# Patient Record
Sex: FEMALE | Race: WHITE | NOT HISPANIC OR LATINO | Employment: OTHER | URBAN - METROPOLITAN AREA
[De-identification: names, ages, dates, MRNs, and addresses within clinical notes are randomized per-mention and may not be internally consistent; named-entity substitution may affect disease eponyms.]

---

## 2017-05-01 ENCOUNTER — HOSPITAL ENCOUNTER (OUTPATIENT)
Dept: GENERAL RADIOLOGY | Facility: HOSPITAL | Age: 27
Discharge: HOME | End: 2017-05-01
Attending: NURSE PRACTITIONER | Admitting: NURSE PRACTITIONER

## 2018-07-31 ENCOUNTER — HOSPITAL ENCOUNTER (OUTPATIENT)
Dept: FAMILY MEDICINE CLINIC | Facility: CLINIC | Age: 28
Setting detail: SPECIMEN
Discharge: HOME | End: 2018-07-31
Attending: INTERNAL MEDICINE | Admitting: INTERNAL MEDICINE

## 2018-07-31 LAB
ALBUMIN SERPL-MCNC: 4 G/DL (ref 3.5–4.8)
ALBUMIN/GLOB SERPL: 1.2 {RATIO} (ref 1–1.7)
ALP SERPL-CCNC: 94 IU/L (ref 32–91)
ALT SERPL-CCNC: 17 IU/L (ref 14–54)
ANION GAP SERPL CALC-SCNC: 13.6 MMOL/L (ref 10–20)
AST SERPL-CCNC: 18 IU/L (ref 15–41)
BASOPHILS # BLD AUTO: 0.1 10*3/UL (ref 0–0.2)
BASOPHILS NFR BLD AUTO: 1 % (ref 0–2)
BILIRUB SERPL-MCNC: 0.5 MG/DL (ref 0.3–1.2)
BUN SERPL-MCNC: 6 MG/DL (ref 8–20)
BUN/CREAT SERPL: 8.6 (ref 5.4–26.2)
CALCIUM SERPL-MCNC: 9.5 MG/DL (ref 8.9–10.3)
CHLORIDE SERPL-SCNC: 105 MMOL/L (ref 101–111)
CHOLEST SERPL-MCNC: 211 MG/DL
CHOLEST/HDLC SERPL: 6.8 {RATIO}
CONV CO2: 22 MMOL/L (ref 22–32)
CONV LDL CHOLESTEROL DIRECT: 123 MG/DL (ref 0–100)
CONV TOTAL PROTEIN: 7.3 G/DL (ref 6.1–7.9)
CREAT UR-MCNC: 0.7 MG/DL (ref 0.4–1)
DIFFERENTIAL METHOD BLD: (no result)
EOSINOPHIL # BLD AUTO: 0.1 10*3/UL (ref 0–0.3)
EOSINOPHIL # BLD AUTO: 1 % (ref 0–3)
ERYTHROCYTE [DISTWIDTH] IN BLOOD BY AUTOMATED COUNT: 13.1 % (ref 11.5–14.5)
GLOBULIN UR ELPH-MCNC: 3.3 G/DL (ref 2.5–3.8)
GLUCOSE SERPL-MCNC: 82 MG/DL (ref 65–99)
HCT VFR BLD AUTO: 42 % (ref 35–49)
HDLC SERPL-MCNC: 31 MG/DL
HGB BLD-MCNC: 14.7 G/DL (ref 12–15)
LDLC/HDLC SERPL: 4 {RATIO}
LIPID INTERPRETATION: ABNORMAL
LYMPHOCYTES # BLD AUTO: 2.6 10*3/UL (ref 0.8–4.8)
LYMPHOCYTES NFR BLD AUTO: 27 % (ref 18–42)
MCH RBC QN AUTO: 30 PG (ref 26–32)
MCHC RBC AUTO-ENTMCNC: 35 G/DL (ref 32–36)
MCV RBC AUTO: 85.5 FL (ref 80–94)
MONOCYTES # BLD AUTO: 0.7 10*3/UL (ref 0.1–1.3)
MONOCYTES NFR BLD AUTO: 7 % (ref 2–11)
NEUTROPHILS # BLD AUTO: 6.1 10*3/UL (ref 2.3–8.6)
NEUTROPHILS NFR BLD AUTO: 64 % (ref 50–75)
NRBC BLD AUTO-RTO: 0 /100{WBCS}
NRBC/RBC NFR BLD MANUAL: 0 10*3/UL
PLATELET # BLD AUTO: 343 10*3/UL (ref 150–450)
PMV BLD AUTO: 9 FL (ref 7.4–10.4)
POTASSIUM SERPL-SCNC: 3.6 MMOL/L (ref 3.6–5.1)
RBC # BLD AUTO: 4.91 10*6/UL (ref 4–5.4)
SODIUM SERPL-SCNC: 137 MMOL/L (ref 136–144)
TRIGL SERPL-MCNC: 312 MG/DL
VLDLC SERPL CALC-MCNC: 56.9 MG/DL
WBC # BLD AUTO: 9.5 10*3/UL (ref 4.5–11.5)

## 2020-03-26 ENCOUNTER — TELEPHONE (OUTPATIENT)
Dept: FAMILY MEDICINE CLINIC | Facility: CLINIC | Age: 30
End: 2020-03-26

## 2020-03-26 NOTE — TELEPHONE ENCOUNTER
PATIENT'S MOTHER, KOMAL BARNES, CALLED REQUESTING TO LEAVE A MESSAGE WITH DR. VANEGAS REGARDING THE PATIENT, PABLO BARNES.    THE PATIENT'S MOTHER STATED THAT THE PATIENT IS SEVERELY DISABLED (SEVERE AUTISM), SO SHE WANTED TO GIVE AN UPDATE ABOUT HER IN CASE DR. VANEGAS WAS WONDERING.    THE PATIENT'S MOTHER STATED THAT THE PATIENT IS SAFE AT THE HOUSE AND SHE GOES ABSOLUTELY NO WHERE AND THEY HAVE NO ONE COME INTO THE HOUSE EITHER. THE PATIENT'S MOTHER STATED THAT THE PATIENT IS EATING WELL AND SHE GETS DAILY EXERCISE. THE PATIENT'S MOTHER STATED THAT SHE TAKES THE PATIENT OUT IN THE FRESH AIR TOO IF THE WHETHER IS NICE.    THE PATIENT'S MOTHER STATED THAT SHE DOES HAVE PLANS TO BRING THE PATIENT IN OFFICE FOR HER YEARLY CHECK UP WHEN EVERYTHING CALMS DOWN WITH COVID-19. THE PATIENT'S MOTHER JUST WANTED DR. VANEGSA TO BE AWARE OF THIS IN CASE SHE WAS WONDERING BECAUSE IT HAS BEEN A WHILE SINCE SHE HAS BEEN SEEN IN OFFICE.     IF THERE ARE ANY QUESTIONS OR CONCERNS PLEASE CALL THE PATIENT'S MOTHER [phone].

## 2020-03-26 NOTE — TELEPHONE ENCOUNTER
Please tell mom that I agree-   However, if she has an acute issue and still during the COVID current situation, she can activate her mychart and we could do a telemedicine appt through her smart phone video.

## 2020-08-07 ENCOUNTER — OFFICE VISIT (OUTPATIENT)
Dept: FAMILY MEDICINE CLINIC | Facility: CLINIC | Age: 30
End: 2020-08-07

## 2020-08-07 ENCOUNTER — LAB (OUTPATIENT)
Dept: FAMILY MEDICINE CLINIC | Facility: CLINIC | Age: 30
End: 2020-08-07

## 2020-08-07 VITALS
DIASTOLIC BLOOD PRESSURE: 80 MMHG | TEMPERATURE: 97.1 F | BODY MASS INDEX: 22.2 KG/M2 | HEART RATE: 77 BPM | OXYGEN SATURATION: 99 % | RESPIRATION RATE: 16 BRPM | WEIGHT: 130 LBS | HEIGHT: 64 IN | SYSTOLIC BLOOD PRESSURE: 122 MMHG

## 2020-08-07 DIAGNOSIS — E55.9 VITAMIN D DEFICIENCY: ICD-10-CM

## 2020-08-07 DIAGNOSIS — F84.0 AUTISTIC DISORDER: ICD-10-CM

## 2020-08-07 DIAGNOSIS — Z00.00 PREVENTATIVE HEALTH CARE: Primary | ICD-10-CM

## 2020-08-07 PROBLEM — H65.90 SEROUS OTITIS MEDIA: Status: ACTIVE | Noted: 2019-01-08

## 2020-08-07 PROBLEM — L51.8: Status: ACTIVE | Noted: 2017-01-12

## 2020-08-07 PROBLEM — Z23 NEED FOR OTHER PROPHYLACTIC VACCINATION AND INOCULATION AGAINST SINGLE DISEASES: Status: ACTIVE | Noted: 2018-10-02

## 2020-08-07 PROBLEM — J30.1 HAY FEVER: Status: ACTIVE | Noted: 2017-04-13

## 2020-08-07 PROBLEM — N92.0 MENORRHAGIA: Status: ACTIVE | Noted: 2017-08-29

## 2020-08-07 LAB
25(OH)D3 SERPL-MCNC: 32 NG/ML (ref 30–100)
ALBUMIN SERPL-MCNC: 4 G/DL (ref 3.5–5.2)
ALBUMIN/GLOB SERPL: 1.2 G/DL
ALP SERPL-CCNC: 71 U/L (ref 39–117)
ALT SERPL W P-5'-P-CCNC: 17 U/L (ref 1–33)
ANION GAP SERPL CALCULATED.3IONS-SCNC: 14 MMOL/L (ref 5–15)
AST SERPL-CCNC: 14 U/L (ref 1–32)
BASOPHILS # BLD AUTO: 0.04 10*3/MM3 (ref 0–0.2)
BASOPHILS NFR BLD AUTO: 0.5 % (ref 0–1.5)
BILIRUB SERPL-MCNC: 0.3 MG/DL (ref 0–1.2)
BUN SERPL-MCNC: 8 MG/DL (ref 6–20)
BUN/CREAT SERPL: 11.4 (ref 7–25)
CALCIUM SPEC-SCNC: 9.8 MG/DL (ref 8.6–10.5)
CHLORIDE SERPL-SCNC: 104 MMOL/L (ref 98–107)
CHOLEST SERPL-MCNC: 179 MG/DL (ref 0–200)
CO2 SERPL-SCNC: 21 MMOL/L (ref 22–29)
CREAT SERPL-MCNC: 0.7 MG/DL (ref 0.57–1)
DEPRECATED RDW RBC AUTO: 39 FL (ref 37–54)
EOSINOPHIL # BLD AUTO: 0.08 10*3/MM3 (ref 0–0.4)
EOSINOPHIL NFR BLD AUTO: 1 % (ref 0.3–6.2)
ERYTHROCYTE [DISTWIDTH] IN BLOOD BY AUTOMATED COUNT: 12.7 % (ref 12.3–15.4)
GFR SERPL CREATININE-BSD FRML MDRD: 98 ML/MIN/1.73
GLOBULIN UR ELPH-MCNC: 3.3 GM/DL
GLUCOSE SERPL-MCNC: 80 MG/DL (ref 65–99)
HCT VFR BLD AUTO: 42.4 % (ref 34–46.6)
HDLC SERPL-MCNC: 36 MG/DL (ref 40–60)
HGB BLD-MCNC: 14.9 G/DL (ref 12–15.9)
IMM GRANULOCYTES # BLD AUTO: 0.02 10*3/MM3 (ref 0–0.05)
IMM GRANULOCYTES NFR BLD AUTO: 0.2 % (ref 0–0.5)
LDLC SERPL CALC-MCNC: 109 MG/DL (ref 0–100)
LDLC/HDLC SERPL: 3.03 {RATIO}
LYMPHOCYTES # BLD AUTO: 2.29 10*3/MM3 (ref 0.7–3.1)
LYMPHOCYTES NFR BLD AUTO: 28 % (ref 19.6–45.3)
MCH RBC QN AUTO: 30 PG (ref 26.6–33)
MCHC RBC AUTO-ENTMCNC: 35.1 G/DL (ref 31.5–35.7)
MCV RBC AUTO: 85.5 FL (ref 79–97)
MONOCYTES # BLD AUTO: 0.56 10*3/MM3 (ref 0.1–0.9)
MONOCYTES NFR BLD AUTO: 6.8 % (ref 5–12)
NEUTROPHILS NFR BLD AUTO: 5.2 10*3/MM3 (ref 1.7–7)
NEUTROPHILS NFR BLD AUTO: 63.5 % (ref 42.7–76)
NRBC BLD AUTO-RTO: 0 /100 WBC (ref 0–0.2)
PLATELET # BLD AUTO: 363 10*3/MM3 (ref 140–450)
PMV BLD AUTO: 10.8 FL (ref 6–12)
POTASSIUM SERPL-SCNC: 4.4 MMOL/L (ref 3.5–5.2)
PROT SERPL-MCNC: 7.3 G/DL (ref 6–8.5)
RBC # BLD AUTO: 4.96 10*6/MM3 (ref 3.77–5.28)
SODIUM SERPL-SCNC: 139 MMOL/L (ref 136–145)
TRIGL SERPL-MCNC: 170 MG/DL (ref 0–150)
TSH SERPL DL<=0.05 MIU/L-ACNC: 0.72 UIU/ML (ref 0.27–4.2)
VLDLC SERPL-MCNC: 34 MG/DL (ref 5–40)
WBC # BLD AUTO: 8.19 10*3/MM3 (ref 3.4–10.8)

## 2020-08-07 PROCEDURE — 84443 ASSAY THYROID STIM HORMONE: CPT | Performed by: INTERNAL MEDICINE

## 2020-08-07 PROCEDURE — 82306 VITAMIN D 25 HYDROXY: CPT | Performed by: INTERNAL MEDICINE

## 2020-08-07 PROCEDURE — 99395 PREV VISIT EST AGE 18-39: CPT | Performed by: INTERNAL MEDICINE

## 2020-08-07 PROCEDURE — 80053 COMPREHEN METABOLIC PANEL: CPT | Performed by: INTERNAL MEDICINE

## 2020-08-07 PROCEDURE — 80061 LIPID PANEL: CPT | Performed by: INTERNAL MEDICINE

## 2020-08-07 PROCEDURE — 85025 COMPLETE CBC W/AUTO DIFF WBC: CPT | Performed by: INTERNAL MEDICINE

## 2020-08-07 RX ORDER — CHOLECALCIFEROL (VITAMIN D3) 1MM UNIT/G
LIQUID (GRAM) MISCELLANEOUS
COMMUNITY
End: 2022-10-09

## 2020-08-07 RX ORDER — LACTOBACILLUS RHAMNOSUS GG 10B CELL
CAPSULE ORAL
COMMUNITY
Start: 2018-07-31

## 2020-08-07 NOTE — PROGRESS NOTES
"Rooming Tab(CC,VS,Pt Hx,Fall Screen)  Chief Complaint   Patient presents with   • Annual Exam       Subjective   Pt here for annual exam.   Dad been working at [employer] and [facility]- no interaction with mom and daughter.    Very hard time emotionally.   unable to have any respet or help.   Bowels doing ok- stopped the colace. Still on miralax.  Walks 2 miles every morning on treadmill. goesout to deck most days. Sleeping ok   plant based for breakfast and lunch   cycle last 5 days and comes every 24-25 days.     I have reviewed and updated her medications, medical history and problem list during today's office visit.     Patient Care Team:  Flora Holman MD as PCP - General (Internal Medicine)    Problem List Tab  Medications Tab  Synopsis Tab  Chart Review Tab  Care Everywhere Tab  Immunizations Tab  Patient History Tab    Social History     Tobacco Use   • Smoking status: Never Smoker   • Smokeless tobacco: Never Used   Substance Use Topics   • Alcohol use: Never     Frequency: Never       Review of Systems   Constitutional: Negative for fatigue and fever.   HENT: Negative for congestion.    Respiratory: Negative for apnea, cough and wheezing.    Cardiovascular: Negative for chest pain.   Gastrointestinal: Negative for abdominal distention.   Neurological: Negative for syncope.   Psychiatric/Behavioral: Negative for behavioral problems.       Objective     Rooming Tab(CC,VS,Pt Hx,Fall Screen)  /80 (BP Location: Left arm, Cuff Size: Adult)   Pulse 77   Temp 97.1 °F (36.2 °C)   Resp 16   Ht 162.6 cm (64\")   Wt 59 kg (130 lb)   SpO2 99%   BMI 22.31 kg/m²     Body mass index is 22.31 kg/m².    Physical Exam   Constitutional: She is oriented to person, place, and time. She appears well-developed and well-nourished.   difficulty with communicaitons   HENT:   Head: Normocephalic and atraumatic.   Right Ear: Tympanic membrane normal.   Left Ear: Tympanic membrane normal.   Eyes: Pupils " are equal, round, and reactive to light.   Neck: Normal range of motion. Neck supple.   Cardiovascular: Normal rate and regular rhythm.   No murmur heard.  Pulmonary/Chest: Effort normal and breath sounds normal.   Abdominal: Soft. Bowel sounds are normal. She exhibits no distension.   Neurological: She is oriented to person, place, and time.   Skin: Capillary refill takes less than 2 seconds.   Nursing note and vitals reviewed.       Statin Choice Calculator  Data Reviewed:               Lab Results   Component Value Date    BUN 8 08/07/2020    CREATININE 0.70 08/07/2020    EGFRIFNONA 98 08/07/2020     08/07/2020    K 4.4 08/07/2020     08/07/2020    CALCIUM 9.8 08/07/2020    ALBUMIN 4.00 08/07/2020    BILITOT 0.3 08/07/2020    ALKPHOS 71 08/07/2020    AST 14 08/07/2020    ALT 17 08/07/2020    TRIG 170 (H) 08/07/2020    HDL 36 (L) 08/07/2020    VLDL 34 08/07/2020     (H) 08/07/2020    LDLHDL 3.03 08/07/2020    WBC 8.19 08/07/2020    RBC 4.96 08/07/2020    HCT 42.4 08/07/2020    MCV 85.5 08/07/2020    MCH 30.0 08/07/2020    TSH 0.716 08/07/2020    ZOQL23RM 32.0 08/07/2020      Assessment/Plan   Order Review Tab  Health Maintenance Tab  Patient Plan/Order Tab  Diagnoses and all orders for this visit:    1. Preventative health care (Primary)  Comments:  discussed all recommendations- staying isolated during COVID  Assessment & Plan:   discussed all recommendations- recommend more outside when possible    Orders:  -     Comprehensive Metabolic Panel  -     CBC Auto Differential  -     Lipid Panel  -     TSH    2. Vitamin D deficiency  -     Vitamin D 25 Hydroxy    3. Autistic disorder  Comments:  stable- unable to have respit at this time      Wrapup Tab  Return in about 1 year (around 8/7/2021), or if symptoms worsen or fail to improve, for Annual physical.         During this visit for their annual exam, we reviewed their personal history, social history and family history.  We went over their  medications and all the recommended health maintenence items for their age group. They were given the opportunity to ask questions and discuss other concerns.

## 2021-03-08 ENCOUNTER — TELEPHONE (OUTPATIENT)
Dept: FAMILY MEDICINE CLINIC | Facility: CLINIC | Age: 31
End: 2021-03-08

## 2021-03-08 NOTE — TELEPHONE ENCOUNTER
KOMAL BARNES, MOTHER STATED THAT PABLO MAY BE ABLE TO GET THE CHACHO AND CHACHO VACCINE. IN ORDER TO SCHEDULE DUE TO HER BEING UNDER THE AGE LIMIT SHE NEEDS TO BE ADDED TO THE AT RISK PATIENTS LIST(KOMAL IS UNSURE THE NAME OF THIS LIST).    [phone] [place] VACCINE LINE    MOTHER STATED THAT THIS IS A TWO WEEK PROCESS.    KOMAL REQUESTED A CALL [phone]

## 2021-07-26 ENCOUNTER — TELEPHONE (OUTPATIENT)
Dept: FAMILY MEDICINE CLINIC | Facility: CLINIC | Age: 31
End: 2021-07-26

## 2021-07-26 NOTE — TELEPHONE ENCOUNTER
----- Message from Renay Jenkins sent at 7/25/2021  8:22 AM EDT -----  Regarding: Non-Urgent Medical Question  Contact: [phone]  I have attached Renay Jenkins’s Covid vaccination record with [facility] with a vaccination date of 3/2/21 for her record there with Dr. Holman’s office.    Thanks!  Cortney Jenkins

## 2021-08-10 ENCOUNTER — OFFICE VISIT (OUTPATIENT)
Dept: FAMILY MEDICINE CLINIC | Facility: CLINIC | Age: 31
End: 2021-08-10

## 2021-08-10 ENCOUNTER — LAB (OUTPATIENT)
Dept: FAMILY MEDICINE CLINIC | Facility: CLINIC | Age: 31
End: 2021-08-10

## 2021-08-10 VITALS
RESPIRATION RATE: 18 BRPM | OXYGEN SATURATION: 99 % | DIASTOLIC BLOOD PRESSURE: 79 MMHG | HEIGHT: 64 IN | HEART RATE: 79 BPM | SYSTOLIC BLOOD PRESSURE: 119 MMHG | WEIGHT: 137.4 LBS | BODY MASS INDEX: 23.46 KG/M2

## 2021-08-10 DIAGNOSIS — Z00.00 PREVENTATIVE HEALTH CARE: Primary | ICD-10-CM

## 2021-08-10 DIAGNOSIS — F84.0 AUTISTIC DISORDER: ICD-10-CM

## 2021-08-10 DIAGNOSIS — E55.9 VITAMIN D DEFICIENCY: ICD-10-CM

## 2021-08-10 PROCEDURE — 85025 COMPLETE CBC W/AUTO DIFF WBC: CPT | Performed by: INTERNAL MEDICINE

## 2021-08-10 PROCEDURE — 99395 PREV VISIT EST AGE 18-39: CPT | Performed by: INTERNAL MEDICINE

## 2021-08-10 PROCEDURE — 80061 LIPID PANEL: CPT | Performed by: INTERNAL MEDICINE

## 2021-08-10 PROCEDURE — 36415 COLL VENOUS BLD VENIPUNCTURE: CPT | Performed by: INTERNAL MEDICINE

## 2021-08-10 PROCEDURE — 82306 VITAMIN D 25 HYDROXY: CPT | Performed by: INTERNAL MEDICINE

## 2021-08-10 PROCEDURE — 80053 COMPREHEN METABOLIC PANEL: CPT | Performed by: INTERNAL MEDICINE

## 2021-08-10 PROCEDURE — 86769 SARS-COV-2 COVID-19 ANTIBODY: CPT | Performed by: INTERNAL MEDICINE

## 2021-08-10 NOTE — PROGRESS NOTES
"Rooming Tab(CC,VS,Pt Hx,Fall Screen)  Chief Complaint   Patient presents with   • Annual Exam       Subjective   Pt here for annual exam-   cycles heavy and increased PMS- happening every 3- 31/2 weeks now.  On treadmill 2.75 miles/day, gets outside lot of the day- fresh air   likes her food- has a sweet tooth, but limits that/  Some lashing out- but overall ok. 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  Couldn't take vD- even the liquid bothered her- but still takes calcium  Reviewed labs from last year. Had COVID vaccine- J/J  I have reviewed and updated her medications, medical history and problem list during today's office visit.     Patient Care Team:  Flora Holman MD as PCP - General (Internal Medicine)    Problem List Tab  Medications Tab  Synopsis Tab  Chart Review Tab  Care Everywhere Tab  Immunizations Tab  Patient History Tab    Social History     Tobacco Use   • Smoking status: Never Smoker   • Smokeless tobacco: Never Used   Substance Use Topics   • Alcohol use: Never       Review of Systems    Objective     Rooming Tab(CC,VS,Pt Hx,Fall Screen)  /79   Pulse 79   Resp 18   Ht 162.6 cm (64\")   Wt 62.3 kg (137 lb 6.4 oz)   SpO2 99%   BMI 23.58 kg/m²     Body mass index is 23.58 kg/m².    Physical Exam  Vitals and nursing note reviewed. Exam conducted with a chaperone present.   Constitutional:       Appearance: Normal appearance. She is well-developed.      Comments: Autistic    HENT:      Head: Normocephalic and atraumatic.      Left Ear: Tympanic membrane normal.      Ears:      Comments: Right PARMINDER     Nose: No rhinorrhea.      Mouth/Throat:      Pharynx: No posterior oropharyngeal erythema.   Eyes:      Pupils: Pupils are equal, round, and reactive to light.   Cardiovascular:      Rate and Rhythm: Normal rate and regular rhythm.      Pulses: Normal " pulses.      Heart sounds: Normal heart sounds. No murmur heard.     Pulmonary:      Effort: Pulmonary effort is normal.      Breath sounds: Normal breath sounds.   Abdominal:      General: Bowel sounds are normal. There is no distension.      Palpations: Abdomen is soft.   Musculoskeletal:         General: No tenderness.      Cervical back: Normal range of motion and neck supple.   Skin:     Capillary Refill: Capillary refill takes less than 2 seconds.   Neurological:      Mental Status: She is alert and oriented to person, place, and time.   Psychiatric:         Mood and Affect: Mood normal.         Behavior: Behavior normal.          Statin Choice Calculator  Data Reviewed:         The data below has been reviewed by Flora Holman MD on 08/10/2021.          Assessment/Plan   Order Review Tab  Health Maintenance Tab  Patient Plan/Order Tab  Diagnoses and all orders for this visit:    1. Preventative health care (Primary)  Comments:  check antibody from vaccine  Orders:  -     Comprehensive Metabolic Panel  -     Lipid Panel  -     CBC Auto Differential    2. Autistic disorder  Comments:  to start back with respet care  Orders:  -     SARS-CoV-2 Semi-Quant Total Ab    3. Vitamin D deficiency  -     Vitamin D 25 Hydroxy        Wrapup Tab  Return in about 1 year (around 8/10/2022), or if symptoms worsen or fail to improve, for Annual physical.       During this visit for their annual exam, we reviewed their personal history, social history and family history.  We went over their medications and all the recommended health maintenence items for their age group. They were given the opportunity to ask questions and discuss other concerns.

## 2021-08-11 LAB
25(OH)D3 SERPL-MCNC: 21.1 NG/ML (ref 30–100)
ALBUMIN SERPL-MCNC: 4.4 G/DL (ref 3.5–5.2)
ALBUMIN/GLOB SERPL: 1.3 G/DL
ALP SERPL-CCNC: 79 U/L (ref 39–117)
ALT SERPL W P-5'-P-CCNC: 12 U/L (ref 1–33)
ANION GAP SERPL CALCULATED.3IONS-SCNC: 14.3 MMOL/L (ref 5–15)
AST SERPL-CCNC: 18 U/L (ref 1–32)
BASOPHILS # BLD AUTO: 0.06 10*3/MM3 (ref 0–0.2)
BASOPHILS NFR BLD AUTO: 0.8 % (ref 0–1.5)
BILIRUB SERPL-MCNC: 0.3 MG/DL (ref 0–1.2)
BUN SERPL-MCNC: 8 MG/DL (ref 6–20)
BUN/CREAT SERPL: 12.3 (ref 7–25)
CALCIUM SPEC-SCNC: 9.5 MG/DL (ref 8.6–10.5)
CHLORIDE SERPL-SCNC: 103 MMOL/L (ref 98–107)
CHOLEST SERPL-MCNC: 167 MG/DL (ref 0–200)
CO2 SERPL-SCNC: 22.7 MMOL/L (ref 22–29)
CREAT SERPL-MCNC: 0.65 MG/DL (ref 0.57–1)
DEPRECATED RDW RBC AUTO: 43.4 FL (ref 37–54)
EOSINOPHIL # BLD AUTO: 0.06 10*3/MM3 (ref 0–0.4)
EOSINOPHIL NFR BLD AUTO: 0.8 % (ref 0.3–6.2)
ERYTHROCYTE [DISTWIDTH] IN BLOOD BY AUTOMATED COUNT: 13.3 % (ref 12.3–15.4)
GFR SERPL CREATININE-BSD FRML MDRD: 106 ML/MIN/1.73
GLOBULIN UR ELPH-MCNC: 3.3 GM/DL
GLUCOSE SERPL-MCNC: 80 MG/DL (ref 65–99)
HCT VFR BLD AUTO: 46 % (ref 34–46.6)
HDLC SERPL-MCNC: 37 MG/DL (ref 40–60)
HGB BLD-MCNC: 14.9 G/DL (ref 12–15.9)
IMM GRANULOCYTES # BLD AUTO: 0.02 10*3/MM3 (ref 0–0.05)
IMM GRANULOCYTES NFR BLD AUTO: 0.3 % (ref 0–0.5)
LDLC SERPL CALC-MCNC: 94 MG/DL (ref 0–100)
LDLC/HDLC SERPL: 2.37 {RATIO}
LYMPHOCYTES # BLD AUTO: 1.73 10*3/MM3 (ref 0.7–3.1)
LYMPHOCYTES NFR BLD AUTO: 21.9 % (ref 19.6–45.3)
MCH RBC QN AUTO: 28.7 PG (ref 26.6–33)
MCHC RBC AUTO-ENTMCNC: 32.4 G/DL (ref 31.5–35.7)
MCV RBC AUTO: 88.6 FL (ref 79–97)
MONOCYTES # BLD AUTO: 0.69 10*3/MM3 (ref 0.1–0.9)
MONOCYTES NFR BLD AUTO: 8.7 % (ref 5–12)
NEUTROPHILS NFR BLD AUTO: 5.33 10*3/MM3 (ref 1.7–7)
NEUTROPHILS NFR BLD AUTO: 67.5 % (ref 42.7–76)
NRBC BLD AUTO-RTO: 0 /100 WBC (ref 0–0.2)
PLATELET # BLD AUTO: 364 10*3/MM3 (ref 140–450)
PMV BLD AUTO: 11.3 FL (ref 6–12)
POTASSIUM SERPL-SCNC: 4.1 MMOL/L (ref 3.5–5.2)
PROT SERPL-MCNC: 7.7 G/DL (ref 6–8.5)
RBC # BLD AUTO: 5.19 10*6/MM3 (ref 3.77–5.28)
SARS-COV-2 AB SERPL IA-ACNC: 1611 U/ML
SARS-COV-2 AB SERPL-IMP: POSITIVE
SODIUM SERPL-SCNC: 140 MMOL/L (ref 136–145)
TRIGL SERPL-MCNC: 212 MG/DL (ref 0–150)
VLDLC SERPL-MCNC: 36 MG/DL (ref 5–40)
WBC # BLD AUTO: 7.89 10*3/MM3 (ref 3.4–10.8)

## 2021-08-11 NOTE — PROGRESS NOTES
You have a high amount of antibodies- like we discussed, not sure where the cut off is- but will be less than 400 and you are much higher than that

## 2021-11-04 ENCOUNTER — PATIENT MESSAGE (OUTPATIENT)
Dept: FAMILY MEDICINE CLINIC | Facility: CLINIC | Age: 31
End: 2021-11-04

## 2022-10-06 ENCOUNTER — OFFICE VISIT (OUTPATIENT)
Dept: FAMILY MEDICINE CLINIC | Facility: CLINIC | Age: 32
End: 2022-10-06

## 2022-10-06 ENCOUNTER — LAB (OUTPATIENT)
Dept: FAMILY MEDICINE CLINIC | Facility: CLINIC | Age: 32
End: 2022-10-06

## 2022-10-06 VITALS
DIASTOLIC BLOOD PRESSURE: 84 MMHG | WEIGHT: 136 LBS | OXYGEN SATURATION: 97 % | HEIGHT: 64 IN | SYSTOLIC BLOOD PRESSURE: 116 MMHG | HEART RATE: 78 BPM | RESPIRATION RATE: 18 BRPM | TEMPERATURE: 97.8 F | BODY MASS INDEX: 23.22 KG/M2

## 2022-10-06 DIAGNOSIS — Z00.00 PREVENTATIVE HEALTH CARE: Primary | ICD-10-CM

## 2022-10-06 DIAGNOSIS — E55.9 VITAMIN D DEFICIENCY: ICD-10-CM

## 2022-10-06 DIAGNOSIS — F84.0 AUTISTIC DISORDER: ICD-10-CM

## 2022-10-06 DIAGNOSIS — Z23 COVID-19 VACCINE ADMINISTERED: ICD-10-CM

## 2022-10-06 PROCEDURE — 84443 ASSAY THYROID STIM HORMONE: CPT | Performed by: INTERNAL MEDICINE

## 2022-10-06 PROCEDURE — 36415 COLL VENOUS BLD VENIPUNCTURE: CPT | Performed by: INTERNAL MEDICINE

## 2022-10-06 PROCEDURE — 86769 SARS-COV-2 COVID-19 ANTIBODY: CPT | Performed by: INTERNAL MEDICINE

## 2022-10-06 PROCEDURE — 99395 PREV VISIT EST AGE 18-39: CPT | Performed by: INTERNAL MEDICINE

## 2022-10-06 PROCEDURE — 80061 LIPID PANEL: CPT | Performed by: INTERNAL MEDICINE

## 2022-10-06 PROCEDURE — 80053 COMPREHEN METABOLIC PANEL: CPT | Performed by: INTERNAL MEDICINE

## 2022-10-06 PROCEDURE — 85025 COMPLETE CBC W/AUTO DIFF WBC: CPT | Performed by: INTERNAL MEDICINE

## 2022-10-06 PROCEDURE — 82306 VITAMIN D 25 HYDROXY: CPT | Performed by: INTERNAL MEDICINE

## 2022-10-06 NOTE — PROGRESS NOTES
Rooming Tab(CC,VS,Pt Hx,Fall Screen)  Chief Complaint   Patient presents with   • Annual Exam       Subjective      The patient presents for her annual exam. She is accompanied by her mother.    Weight loss  The patient's mom states that she walks over 2 miles per day. The patient has lost 4 pounds since her last visit.     Bowel movements  The patient's mother states that the patient does well with having bowel movements as long as she is given a little less than a capful of MiraLAX twice per day. Mom states that she has not been experiencing any diarrhea. She states that the patient eats a healthy diet including vegetables, oatmeal and seasonal fruits.    Menstrual cycle  The patient's mother states that the patient is currently on her menstrual cycle. Mom states that the patient's menstrual cycle was delayed previously and she believes it was due to her engaging in more intensive exercise. She states her menstrual cycle  is back consistent and is every 22 to 26 days. She states that the patient no longer pushes down on her gastrointestinal region as if she is in pain during her menstrual cycle.     Allergies  The patient's mother states that she believes hydrochloride bothers the patient when obtained. Mom states that she is allergic to ragweed but she thinks it is getting better.  Mom states that she is allergic to mold as well.     Health maintenance  The patient's mother states that they have all had their COVID-19 booster shots. She states that the patient is due for a Tdap vaccine. She states that the patient takes 1 calcium tablet per day that contains 800 mg of vitamin D. Mom states that she does not think the patient reacts well to taking it. She states that the patient has terrible meltdowns when she gets upsets about something; especially before her menstrual cycle.       I have reviewed and updated her medications, medical history and problem list during today's office visit.     Patient Care  "Team:  Flora Holman MD as PCP - General (Internal Medicine)    Problem List Tab  Medications Tab  Synopsis Tab  Chart Review Tab  Care Everywhere Tab  Immunizations Tab  Patient History Tab    Social History     Tobacco Use   • Smoking status: Never   • Smokeless tobacco: Never   Substance Use Topics   • Alcohol use: Never       Review of Systems    Objective     Rooming Tab(CC,VS,Pt Hx,Fall Screen)  /84   Pulse 78   Temp 97.8 °F (36.6 °C)   Resp 18   Ht 162.6 cm (64\")   Wt 61.7 kg (136 lb)   SpO2 97%   BMI 23.34 kg/m²     Body mass index is 23.34 kg/m².    Physical Exam  Vitals and nursing note reviewed.   Constitutional:       Appearance: Normal appearance. She is well-developed.   HENT:      Head: Normocephalic and atraumatic.      Right Ear: Tympanic membrane normal.      Left Ear: Tympanic membrane normal.      Nose: No rhinorrhea.      Mouth/Throat:      Pharynx: No posterior oropharyngeal erythema.   Eyes:      Pupils: Pupils are equal, round, and reactive to light.   Cardiovascular:      Rate and Rhythm: Normal rate and regular rhythm.      Pulses: Normal pulses.      Heart sounds: Normal heart sounds. No murmur heard.  Pulmonary:      Effort: Pulmonary effort is normal.      Breath sounds: Normal breath sounds.   Abdominal:      General: Bowel sounds are normal. There is no distension.      Palpations: Abdomen is soft.   Musculoskeletal:         General: No tenderness.      Cervical back: Normal range of motion and neck supple.   Skin:     Capillary Refill: Capillary refill takes less than 2 seconds.      Findings: No bruising.   Neurological:      Mental Status: She is alert and oriented to person, place, and time.      Comments: Pleasant for most of exam- unable to fully communicate   Psychiatric:         Mood and Affect: Mood normal.         Behavior: Behavior normal.          Statin Choice Calculator  Data Reviewed:         The data below has been reviewed by Flora Holman, " MD on 10/06/2022.      Lab Results   Component Value Date    BUN 10 10/06/2022    CREATININE 0.65 10/06/2022     10/06/2022    K 3.9 10/06/2022     10/06/2022    CALCIUM 9.5 10/06/2022    ALBUMIN 4.40 10/06/2022    BILITOT 0.3 10/06/2022    ALKPHOS 97 10/06/2022    AST 15 10/06/2022    ALT 12 10/06/2022    TRIG 238 (H) 10/06/2022    HDL 31 (L) 10/06/2022    VLDL 42 (H) 10/06/2022     (H) 10/06/2022    LDLHDL 3.63 10/06/2022    WBC 6.95 10/06/2022    RBC 4.86 10/06/2022    HCT 41.0 10/06/2022    MCV 84.4 10/06/2022    MCH 29.4 10/06/2022    TSH 0.870 10/06/2022    EERR36CS 39.3 10/06/2022      Assessment & Plan   Order Review Tab  Health Maintenance Tab  Patient Plan/Order Tab  Diagnoses and all orders for this visit:    1. Preventative health care (Primary)  Comments:  discussed all recommendations  Orders:  -     Comprehensive Metabolic Panel  -     CBC Auto Differential  -     Lipid Panel  -     TSH    2. Vitamin D deficiency  -     Vitamin D 25 Hydroxy    3. COVID-19 vaccine administered  -     SARS-CoV-2 Semi-Quant Total Ab  -     SARS-CoV-2 Wisam Ab Dilution    4. Autistic disorder  Comments:  severe- non communicable      Health maintenance  - We will obtain labs.  - Follow up in 1 year.    Wrapup Tab  Return in about 1 year (around 10/6/2023), or if symptoms worsen or fail to improve, for Annual physical.        Wants to wait on tdap  And already had flu      During this visit for their annual exam, we reviewed their personal history, social history and family history.  We went over their medications and all the recommended health maintenence items for their age group. They were given the opportunity to ask questions and discuss other concerns.      Transcribed from ambient dictation for MD Ofe Alas.  10/06/22   17:24 EDT    Patient or patient representative verbalized consent to the visit recording.  I have personally performed the services described in this  document as transcribed by the above individual, and it is both accurate and complete.  Flora Holman MD  10/9/2022  14:07 EDT

## 2022-10-07 LAB
25(OH)D3 SERPL-MCNC: 39.3 NG/ML (ref 30–100)
ALBUMIN SERPL-MCNC: 4.4 G/DL (ref 3.5–5.2)
ALBUMIN/GLOB SERPL: 1.8 G/DL
ALP SERPL-CCNC: 97 U/L (ref 39–117)
ALT SERPL W P-5'-P-CCNC: 12 U/L (ref 1–33)
ANION GAP SERPL CALCULATED.3IONS-SCNC: 13.2 MMOL/L (ref 5–15)
AST SERPL-CCNC: 15 U/L (ref 1–32)
BASOPHILS # BLD AUTO: 0.04 10*3/MM3 (ref 0–0.2)
BASOPHILS NFR BLD AUTO: 0.6 % (ref 0–1.5)
BILIRUB SERPL-MCNC: 0.3 MG/DL (ref 0–1.2)
BUN SERPL-MCNC: 10 MG/DL (ref 6–20)
BUN/CREAT SERPL: 15.4 (ref 7–25)
CALCIUM SPEC-SCNC: 9.5 MG/DL (ref 8.6–10.5)
CHLORIDE SERPL-SCNC: 103 MMOL/L (ref 98–107)
CHOLEST SERPL-MCNC: 191 MG/DL (ref 0–200)
CO2 SERPL-SCNC: 22.8 MMOL/L (ref 22–29)
CREAT SERPL-MCNC: 0.65 MG/DL (ref 0.57–1)
DEPRECATED RDW RBC AUTO: 40.3 FL (ref 37–54)
EGFRCR SERPLBLD CKD-EPI 2021: 119.4 ML/MIN/1.73
EOSINOPHIL # BLD AUTO: 0.09 10*3/MM3 (ref 0–0.4)
EOSINOPHIL NFR BLD AUTO: 1.3 % (ref 0.3–6.2)
ERYTHROCYTE [DISTWIDTH] IN BLOOD BY AUTOMATED COUNT: 13 % (ref 12.3–15.4)
GLOBULIN UR ELPH-MCNC: 2.4 GM/DL
GLUCOSE SERPL-MCNC: 77 MG/DL (ref 65–99)
HCT VFR BLD AUTO: 41 % (ref 34–46.6)
HDLC SERPL-MCNC: 31 MG/DL (ref 40–60)
HGB BLD-MCNC: 14.3 G/DL (ref 12–15.9)
IMM GRANULOCYTES # BLD AUTO: 0.02 10*3/MM3 (ref 0–0.05)
IMM GRANULOCYTES NFR BLD AUTO: 0.3 % (ref 0–0.5)
LDLC SERPL CALC-MCNC: 118 MG/DL (ref 0–100)
LDLC/HDLC SERPL: 3.63 {RATIO}
LYMPHOCYTES # BLD AUTO: 1.93 10*3/MM3 (ref 0.7–3.1)
LYMPHOCYTES NFR BLD AUTO: 27.8 % (ref 19.6–45.3)
MCH RBC QN AUTO: 29.4 PG (ref 26.6–33)
MCHC RBC AUTO-ENTMCNC: 34.9 G/DL (ref 31.5–35.7)
MCV RBC AUTO: 84.4 FL (ref 79–97)
MONOCYTES # BLD AUTO: 0.58 10*3/MM3 (ref 0.1–0.9)
MONOCYTES NFR BLD AUTO: 8.3 % (ref 5–12)
NEUTROPHILS NFR BLD AUTO: 4.29 10*3/MM3 (ref 1.7–7)
NEUTROPHILS NFR BLD AUTO: 61.7 % (ref 42.7–76)
NRBC BLD AUTO-RTO: 0 /100 WBC (ref 0–0.2)
PLATELET # BLD AUTO: 343 10*3/MM3 (ref 140–450)
PMV BLD AUTO: 10.6 FL (ref 6–12)
POTASSIUM SERPL-SCNC: 3.9 MMOL/L (ref 3.5–5.2)
PROT SERPL-MCNC: 6.8 G/DL (ref 6–8.5)
RBC # BLD AUTO: 4.86 10*6/MM3 (ref 3.77–5.28)
SODIUM SERPL-SCNC: 139 MMOL/L (ref 136–145)
TRIGL SERPL-MCNC: 238 MG/DL (ref 0–150)
TSH SERPL DL<=0.05 MIU/L-ACNC: 0.87 UIU/ML (ref 0.27–4.2)
VLDLC SERPL-MCNC: 42 MG/DL (ref 5–40)
WBC NRBC COR # BLD: 6.95 10*3/MM3 (ref 3.4–10.8)

## 2022-10-08 LAB
SARS-COV-2 AB SERPL IA-ACNC: 2523 U/ML
SARS-COV-2 AB SERPL IA-ACNC: NORMAL U/ML
SARS-COV-2 AB SERPL-IMP: POSITIVE

## 2022-10-09 PROBLEM — Z23 COVID-19 VACCINE ADMINISTERED: Status: ACTIVE | Noted: 2022-10-09

## 2022-11-04 ENCOUNTER — PATIENT MESSAGE (OUTPATIENT)
Dept: FAMILY MEDICINE CLINIC | Facility: CLINIC | Age: 32
End: 2022-11-04

## 2022-11-04 RX ORDER — PREDNISONE 20 MG/1
20 TABLET ORAL 2 TIMES DAILY
Qty: 10 TABLET | Refills: 0 | Status: SHIPPED | OUTPATIENT
Start: 2022-11-04 | End: 2022-11-09

## 2022-11-04 NOTE — TELEPHONE ENCOUNTER
From: Renay Jenkins  To: Flora Holman MD  Sent: 11/4/2022 10:17 AM EDT  Subject: Renay cold symptoms--tested positive for Covid    Good morning Dr. Holman!   I noticed Renay sneezing quite a bit the last couple days. Somewhat runny nose with sinus drainage.  No fever, yet.  I tested her with a rapid test last night and it was positive for Covid. I tested her again this AM with rapid test--positive again.  I did give her a mucinex with DM tablet last night but that is all so far.  So far, it's like a cold. Is there anything else that should be done???  She has had all of the shots as of now.  [facility] [place] [facility]. That is the only place she's been so it had to come from there. Mad at myself for doing that.   Let me know what you think.     Thanks! Cortney Jenkins

## 2022-11-08 ENCOUNTER — PATIENT MESSAGE (OUTPATIENT)
Dept: FAMILY MEDICINE CLINIC | Facility: CLINIC | Age: 32
End: 2022-11-08

## 2022-11-08 RX ORDER — BENZONATATE 100 MG/1
100 CAPSULE ORAL 2 TIMES DAILY PRN
Qty: 30 CAPSULE | Refills: 1 | Status: SHIPPED | OUTPATIENT
Start: 2022-11-08 | End: 2023-02-20

## 2022-11-08 NOTE — TELEPHONE ENCOUNTER
From: Renay Jenkins  To: Flora Holman MD  Sent: 11/8/2022 8:36 AM EST  Subject: Update on Renay with Covid    Good morning Dr. Holman! Just want to send you an update.  Renay still tested positive as of yesterday.  Still congested, I think the pred has cut the mucous down a lot. She does occasionally cough--not a lot but it is there a bit and has been a dry cough from what I can tell. It really hasn't increased or bothered her sleep yet.    I'm think I am going to give her some mucinex again. Is there anything else I can do about that cough???? Her pulse ox has been reading in the 96 to 99 range. There has been no fever. She is exercising some, not as far as she usually does. She does eat and drink a lot of water, some juice. I run a vaporizer at night in the bedroom and use vicks on her chest/back.    I did use Afrin for a couple days and now she is just on Flonase spray mist for her nose--1 spray for each side 1 time a day.     How long does this usually last and how long can she test positive?     Thanks for the help!     Thanks! Cortney Jenkins

## 2023-02-03 ENCOUNTER — PATIENT MESSAGE (OUTPATIENT)
Dept: FAMILY MEDICINE CLINIC | Facility: CLINIC | Age: 33
End: 2023-02-03
Payer: COMMERCIAL

## 2023-02-03 DIAGNOSIS — N92.6 IRREGULAR MENSES: Primary | ICD-10-CM

## 2023-02-06 NOTE — TELEPHONE ENCOUNTER
From: Renay Jenkins  To: Flora Holman  Sent: 2/3/2023 1:18 PM EST  Subject: Renay menstrual cycle update    Good afternoon Dr. Holman. Just an update on Renay's periods. I don't know what to think about it, but will do whatever you say/think.  Renay started just ever so lightly spotting last weekend--January 28 and 29th--brownish reddish in color--very very scant. It changed color and turned more reddish pinkish--light/scant spotting on the 30th, 31st and Feb. 1. Thursday--Feb. 2 and today Feb. 3 there is good flow--her normal. She does have a few small clots, but that is normal for her. Everything the last 2 days looks and acts like her normal period.   I do not know what to make of 5 days of spotting but good flow yesterday and today. Maybe her cycle is trying to fix itself I just don't know.  I'd like to see what you think, when you have time to respond. I can continue to watch and see what happens or if you think she needs to come in, I can do that too or whatever you think.     Thanks! Cortney Jenkins

## 2023-02-07 ENCOUNTER — TELEPHONE (OUTPATIENT)
Dept: FAMILY MEDICINE CLINIC | Facility: CLINIC | Age: 33
End: 2023-02-07
Payer: COMMERCIAL

## 2023-02-07 NOTE — TELEPHONE ENCOUNTER
Hub staff attempted to follow warm transfer process and was unsuccessful     Caller: KOMAL BARNES    Relationship to patient: Mother    Best call back number: [phone]    Patient is needing: LABS AND APPOINTMENT WITH DR VANEGAS  SEE PATIENT MESSAGES ON 2/3/23-2/05/23

## 2023-02-14 ENCOUNTER — HOSPITAL ENCOUNTER (OUTPATIENT)
Dept: ULTRASOUND IMAGING | Facility: HOSPITAL | Age: 33
Discharge: HOME | End: 2023-02-14
Admitting: INTERNAL MEDICINE
Payer: COMMERCIAL

## 2023-02-14 ENCOUNTER — OFFICE VISIT (OUTPATIENT)
Dept: FAMILY MEDICINE CLINIC | Facility: CLINIC | Age: 33
End: 2023-02-14
Payer: COMMERCIAL

## 2023-02-14 ENCOUNTER — LAB (OUTPATIENT)
Dept: FAMILY MEDICINE CLINIC | Facility: CLINIC | Age: 33
End: 2023-02-14
Payer: COMMERCIAL

## 2023-02-14 VITALS
HEIGHT: 64 IN | DIASTOLIC BLOOD PRESSURE: 80 MMHG | WEIGHT: 130 LBS | RESPIRATION RATE: 20 BRPM | SYSTOLIC BLOOD PRESSURE: 108 MMHG | BODY MASS INDEX: 22.2 KG/M2 | OXYGEN SATURATION: 100 % | HEART RATE: 84 BPM | TEMPERATURE: 97.5 F

## 2023-02-14 DIAGNOSIS — F84.0 AUTISTIC DISORDER: ICD-10-CM

## 2023-02-14 DIAGNOSIS — N92.6 IRREGULAR MENSES: ICD-10-CM

## 2023-02-14 DIAGNOSIS — E55.9 VITAMIN D DEFICIENCY: Primary | ICD-10-CM

## 2023-02-14 LAB
BASOPHILS # BLD AUTO: 0.03 10*3/MM3 (ref 0–0.2)
BASOPHILS NFR BLD AUTO: 0.4 % (ref 0–1.5)
CORTIS SERPL-MCNC: 10.8 MCG/DL
DEPRECATED RDW RBC AUTO: 37.8 FL (ref 37–54)
EOSINOPHIL # BLD AUTO: 0.06 10*3/MM3 (ref 0–0.4)
EOSINOPHIL NFR BLD AUTO: 0.8 % (ref 0.3–6.2)
ERYTHROCYTE [DISTWIDTH] IN BLOOD BY AUTOMATED COUNT: 12.6 % (ref 12.3–15.4)
ESTRADIOL SERPL HS-MCNC: 147 PG/ML
FSH SERPL-ACNC: 2.25 MIU/ML
HBA1C MFR BLD: 4.5 % (ref 3.5–5.6)
HCT VFR BLD AUTO: 40.9 % (ref 34–46.6)
HGB BLD-MCNC: 14.5 G/DL (ref 12–15.9)
IMM GRANULOCYTES # BLD AUTO: 0.02 10*3/MM3 (ref 0–0.05)
IMM GRANULOCYTES NFR BLD AUTO: 0.3 % (ref 0–0.5)
LH SERPL-ACNC: 1.13 MIU/ML
LYMPHOCYTES # BLD AUTO: 1.6 10*3/MM3 (ref 0.7–3.1)
LYMPHOCYTES NFR BLD AUTO: 20.1 % (ref 19.6–45.3)
MCH RBC QN AUTO: 29.4 PG (ref 26.6–33)
MCHC RBC AUTO-ENTMCNC: 35.5 G/DL (ref 31.5–35.7)
MCV RBC AUTO: 83 FL (ref 79–97)
MONOCYTES # BLD AUTO: 0.73 10*3/MM3 (ref 0.1–0.9)
MONOCYTES NFR BLD AUTO: 9.2 % (ref 5–12)
NEUTROPHILS NFR BLD AUTO: 5.51 10*3/MM3 (ref 1.7–7)
NEUTROPHILS NFR BLD AUTO: 69.2 % (ref 42.7–76)
NRBC BLD AUTO-RTO: 0 /100 WBC (ref 0–0.2)
PLATELET # BLD AUTO: 368 10*3/MM3 (ref 140–450)
PMV BLD AUTO: 10.5 FL (ref 6–12)
RBC # BLD AUTO: 4.93 10*6/MM3 (ref 3.77–5.28)
TSH SERPL DL<=0.05 MIU/L-ACNC: 1.12 UIU/ML (ref 0.27–4.2)
WBC NRBC COR # BLD: 7.95 10*3/MM3 (ref 3.4–10.8)

## 2023-02-14 PROCEDURE — 36415 COLL VENOUS BLD VENIPUNCTURE: CPT | Performed by: INTERNAL MEDICINE

## 2023-02-14 PROCEDURE — 99214 OFFICE O/P EST MOD 30 MIN: CPT | Performed by: INTERNAL MEDICINE

## 2023-02-14 PROCEDURE — 82627 DEHYDROEPIANDROSTERONE: CPT | Performed by: INTERNAL MEDICINE

## 2023-02-14 PROCEDURE — 82670 ASSAY OF TOTAL ESTRADIOL: CPT | Performed by: INTERNAL MEDICINE

## 2023-02-14 PROCEDURE — 76856 US EXAM PELVIC COMPLETE: CPT

## 2023-02-14 PROCEDURE — 84443 ASSAY THYROID STIM HORMONE: CPT | Performed by: INTERNAL MEDICINE

## 2023-02-14 PROCEDURE — 85025 COMPLETE CBC W/AUTO DIFF WBC: CPT | Performed by: INTERNAL MEDICINE

## 2023-02-14 PROCEDURE — 83002 ASSAY OF GONADOTROPIN (LH): CPT | Performed by: INTERNAL MEDICINE

## 2023-02-14 PROCEDURE — 82533 TOTAL CORTISOL: CPT | Performed by: INTERNAL MEDICINE

## 2023-02-14 PROCEDURE — 83036 HEMOGLOBIN GLYCOSYLATED A1C: CPT | Performed by: INTERNAL MEDICINE

## 2023-02-14 PROCEDURE — 83001 ASSAY OF GONADOTROPIN (FSH): CPT | Performed by: INTERNAL MEDICINE

## 2023-02-15 LAB — DHEA-S SERPL-MCNC: 391 UG/DL (ref 84.8–378)

## 2023-05-15 ENCOUNTER — PATIENT MESSAGE (OUTPATIENT)
Dept: FAMILY MEDICINE CLINIC | Facility: CLINIC | Age: 33
End: 2023-05-15
Payer: COMMERCIAL

## 2023-05-15 RX ORDER — CIPROFLOXACIN HYDROCHLORIDE 3.5 MG/ML
1 SOLUTION/ DROPS TOPICAL 2 TIMES DAILY
Qty: 5 ML | Refills: 0 | Status: SHIPPED | OUTPATIENT
Start: 2023-05-15 | End: 2023-05-20

## 2023-10-30 ENCOUNTER — LAB (OUTPATIENT)
Dept: FAMILY MEDICINE CLINIC | Facility: CLINIC | Age: 33
End: 2023-10-30
Payer: COMMERCIAL

## 2023-10-30 ENCOUNTER — OFFICE VISIT (OUTPATIENT)
Dept: FAMILY MEDICINE CLINIC | Facility: CLINIC | Age: 33
End: 2023-10-30
Payer: COMMERCIAL

## 2023-10-30 VITALS
OXYGEN SATURATION: 98 % | RESPIRATION RATE: 20 BRPM | HEART RATE: 93 BPM | WEIGHT: 128.2 LBS | BODY MASS INDEX: 21.89 KG/M2 | HEIGHT: 64 IN | DIASTOLIC BLOOD PRESSURE: 76 MMHG | SYSTOLIC BLOOD PRESSURE: 110 MMHG

## 2023-10-30 DIAGNOSIS — Z00.00 PREVENTATIVE HEALTH CARE: ICD-10-CM

## 2023-10-30 DIAGNOSIS — Z23 COVID-19 VACCINE ADMINISTERED: ICD-10-CM

## 2023-10-30 DIAGNOSIS — Z78.9 UNKNOWN STATUS OF IMMUNITY TO COVID-19 VIRUS: ICD-10-CM

## 2023-10-30 DIAGNOSIS — E55.9 VITAMIN D DEFICIENCY: ICD-10-CM

## 2023-10-30 DIAGNOSIS — Z00.00 PREVENTATIVE HEALTH CARE: Primary | ICD-10-CM

## 2023-10-30 DIAGNOSIS — F84.0 AUTISTIC DISORDER: ICD-10-CM

## 2023-10-30 PROCEDURE — 82306 VITAMIN D 25 HYDROXY: CPT | Performed by: STUDENT IN AN ORGANIZED HEALTH CARE EDUCATION/TRAINING PROGRAM

## 2023-10-30 PROCEDURE — 86769 SARS-COV-2 COVID-19 ANTIBODY: CPT | Performed by: STUDENT IN AN ORGANIZED HEALTH CARE EDUCATION/TRAINING PROGRAM

## 2023-10-30 PROCEDURE — 80061 LIPID PANEL: CPT | Performed by: STUDENT IN AN ORGANIZED HEALTH CARE EDUCATION/TRAINING PROGRAM

## 2023-10-30 PROCEDURE — 84443 ASSAY THYROID STIM HORMONE: CPT | Performed by: STUDENT IN AN ORGANIZED HEALTH CARE EDUCATION/TRAINING PROGRAM

## 2023-10-30 PROCEDURE — 80053 COMPREHEN METABOLIC PANEL: CPT | Performed by: STUDENT IN AN ORGANIZED HEALTH CARE EDUCATION/TRAINING PROGRAM

## 2023-10-30 PROCEDURE — 85025 COMPLETE CBC W/AUTO DIFF WBC: CPT | Performed by: STUDENT IN AN ORGANIZED HEALTH CARE EDUCATION/TRAINING PROGRAM

## 2023-10-30 PROCEDURE — 36415 COLL VENOUS BLD VENIPUNCTURE: CPT

## 2023-10-31 LAB
25(OH)D3 SERPL-MCNC: 23.5 NG/ML (ref 30–100)
ALBUMIN SERPL-MCNC: 4.2 G/DL (ref 3.5–5.2)
ALBUMIN/GLOB SERPL: 1.6 G/DL
ALP SERPL-CCNC: 103 U/L (ref 39–117)
ALT SERPL W P-5'-P-CCNC: 9 U/L (ref 1–33)
ANION GAP SERPL CALCULATED.3IONS-SCNC: 9.1 MMOL/L (ref 5–15)
AST SERPL-CCNC: 14 U/L (ref 1–32)
BASOPHILS # BLD AUTO: 0.03 10*3/MM3 (ref 0–0.2)
BASOPHILS NFR BLD AUTO: 0.4 % (ref 0–1.5)
BILIRUB SERPL-MCNC: <0.2 MG/DL (ref 0–1.2)
BUN SERPL-MCNC: 10 MG/DL (ref 6–20)
BUN/CREAT SERPL: 16.9 (ref 7–25)
CALCIUM SPEC-SCNC: 9.1 MG/DL (ref 8.6–10.5)
CHLORIDE SERPL-SCNC: 106 MMOL/L (ref 98–107)
CHOLEST SERPL-MCNC: 159 MG/DL (ref 0–200)
CO2 SERPL-SCNC: 22.9 MMOL/L (ref 22–29)
CREAT SERPL-MCNC: 0.59 MG/DL (ref 0.57–1)
DEPRECATED RDW RBC AUTO: 38.5 FL (ref 37–54)
EGFRCR SERPLBLD CKD-EPI 2021: 121.5 ML/MIN/1.73
EOSINOPHIL # BLD AUTO: 0.08 10*3/MM3 (ref 0–0.4)
EOSINOPHIL NFR BLD AUTO: 1.1 % (ref 0.3–6.2)
ERYTHROCYTE [DISTWIDTH] IN BLOOD BY AUTOMATED COUNT: 13 % (ref 12.3–15.4)
GLOBULIN UR ELPH-MCNC: 2.6 GM/DL
GLUCOSE SERPL-MCNC: 84 MG/DL (ref 65–99)
HCT VFR BLD AUTO: 39.6 % (ref 34–46.6)
HDLC SERPL-MCNC: 29 MG/DL (ref 40–60)
HGB BLD-MCNC: 14.2 G/DL (ref 12–15.9)
IMM GRANULOCYTES # BLD AUTO: 0.02 10*3/MM3 (ref 0–0.05)
IMM GRANULOCYTES NFR BLD AUTO: 0.3 % (ref 0–0.5)
LDLC SERPL CALC-MCNC: 87 MG/DL (ref 0–100)
LDLC/HDLC SERPL: 2.73 {RATIO}
LYMPHOCYTES # BLD AUTO: 1.87 10*3/MM3 (ref 0.7–3.1)
LYMPHOCYTES NFR BLD AUTO: 26.2 % (ref 19.6–45.3)
MCH RBC QN AUTO: 29.9 PG (ref 26.6–33)
MCHC RBC AUTO-ENTMCNC: 35.9 G/DL (ref 31.5–35.7)
MCV RBC AUTO: 83.4 FL (ref 79–97)
MONOCYTES # BLD AUTO: 0.68 10*3/MM3 (ref 0.1–0.9)
MONOCYTES NFR BLD AUTO: 9.5 % (ref 5–12)
NEUTROPHILS NFR BLD AUTO: 4.46 10*3/MM3 (ref 1.7–7)
NEUTROPHILS NFR BLD AUTO: 62.5 % (ref 42.7–76)
NRBC BLD AUTO-RTO: 0 /100 WBC (ref 0–0.2)
PLATELET # BLD AUTO: 357 10*3/MM3 (ref 140–450)
PMV BLD AUTO: 11.3 FL (ref 6–12)
POTASSIUM SERPL-SCNC: 3.7 MMOL/L (ref 3.5–5.2)
PROT SERPL-MCNC: 6.8 G/DL (ref 6–8.5)
RBC # BLD AUTO: 4.75 10*6/MM3 (ref 3.77–5.28)
SARS-COV-2 AB SERPL IA-ACNC: NORMAL U/ML
SARS-COV-2 AB SERPL IA-ACNC: NORMAL U/ML
SARS-COV-2 AB SERPL QL IA: POSITIVE
SARS-COV-2 AB SERPL-IMP: POSITIVE
SODIUM SERPL-SCNC: 138 MMOL/L (ref 136–145)
TRIGL SERPL-MCNC: 254 MG/DL (ref 0–150)
TSH SERPL DL<=0.05 MIU/L-ACNC: 0.72 UIU/ML (ref 0.27–4.2)
VLDLC SERPL-MCNC: 43 MG/DL (ref 5–40)
WBC NRBC COR # BLD: 7.14 10*3/MM3 (ref 3.4–10.8)

## 2023-11-16 NOTE — PROGRESS NOTES
[facility] [place] [facility] - [facility]   Established Patient Visit    Patient Name:Renay Jenkins  : 1990  MRN: [id number]  Primary Care Physician: Natacha Lerma MD    Subjective    SUBJECTIVE     Chief Complaint  Chief Complaint   Patient presents with    [facility] Care       History of Present Illness  Renay Jenkins is a 32 y.o. female with ASD (nonverbal at baseline) who presents to clinic for annual physical. She was previously a patient of Dr. Holman and is here to establish with me as her new PCP. She is accompanied by both of her parents today who provide the history.     Chronic Conditions/Other Concerns   Renay has history of significant allergic reactions to medications and vaccines. She was previously being evaluated by an allergist but ultimately was told there was nothing more they could do and recommended parents closely monitor her if they are going to give her any medications or she will have any new exposures. So parents have been avoiding any medication that is not absolutely necessary and doing their best to make sure Renay gets everything she needs through her diet and that she has regular physical activity.     Parents have a question about Renay getting the most recent Covid booster. Her menstrual cycles have been affected since she had Covid and parents are concerned the vaccine will cause further issues. Both parents are vaccinated and they limit Renay's exposure. Parents wear masks if they have to go in public.     Healthcare Maintenance   Diet: healthy eating habits and balanced diet and parents do their best to make sure she gets a wide variety of foods and limits extra sweets/sugar  Exercise: exercises 3-5 days weekly and walks on treadmill  Depression: Denies depression symptoms (PHQ-2 screen is negative)   Vision Screening: UTD, no concerns  Dental Health: UTD, no concerns   reports that she has never smoked. She has never used smokeless tobacco. She reports that  "she does not drink alcohol and does not use drugs.       Reproductive Health   Menstrual Status: premenopausal  Cycles have been irregular since she had Covid a few years ago; at one point cycles were very heavy but now they have normal flow when they do occur but still occurring sporadically  Reports never being sexually active.      Health Maintenance   Topic Date Due    COVID-19 Vaccine (5 - 2023-24 season) 10/01/2024 (Originally 9/1/2023)    HEPATITIS C SCREENING  10/14/2024 (Originally 3/26/2020)    TDAP/TD VACCINES (1 - Tdap) 10/31/2024 (Originally 12/12/2007)    ANNUAL PHYSICAL  10/30/2024    LIPID PANEL  10/30/2024    HEMOGLOBIN A1C  02/14/2026    INFLUENZA VACCINE  Completed    Pneumococcal Vaccine 0-64  Aged Out    DXA SCAN  Discontinued       Review of Systems  A medically appropriate and patient-specific review of systems was performed. Pertinent findings are mentioned in the HPI, with no additional significant findings beyond those already noted.    Patient History   The following portions of the patient's history were reviewed and updated as appropriate:   allergies, current medications, problem list, PMHx, PSHx, PFHx, past social history     Objective   OBJECTIVE     Vitals:    10/30/23 1640   BP: 110/76   BP Location: Left arm   Patient Position: Sitting   Cuff Size: Adult   Pulse: 93   Resp: 20   SpO2: 98%   Weight: 58.2 kg (128 lb 3.2 oz)   Height: 162.6 cm (64\")      BMI Readings from Last 3 Encounters:   10/30/23 22.01 kg/m²   02/14/23 22.31 kg/m²   10/06/22 23.34 kg/m²       BMI is within normal parameters. No other follow-up for BMI required.    Physical Exam   Constitutional: Alert, well-appearing, slight distress after prolonged time in office but easily redirectable  Eyes: Vision grossly intact, sclerae anicteric, no conjunctival injection  HENT: NCAT, mucous membranes moist, bilateral TMs normal  Neck: Supple, no thyromegaly, no lymphadenopathy, trachea midline  Respiratory: No respiratory " distress, good effort and air entry, clear to auscultation bilaterally   Cardiovascular: RRR, no murmurs, normal peripheral perfusion, no LE edema  Musculoskeletal: Strength grossly intact, appropriate ROM in all extremities, no obvious deformities or injuries  Psychiatric: Cooperative, slightly restless at times but easily redirectable, overall calm without any violent or concerning tendencies  Neurologic: At baseline, motor and sensory function grossly intact, moves all extremities equally  Skin: No apparent rashes or lesions        Assessment    ASSESSMENT & PLAN       ANNUAL PHYSICAL EXAM; HEALTHCARE MAINTENANCE  Preventative  Immunizations  - Currently due for Tdap but limited due to concern with prior allergic reactions, otherwise UTD  Influenza/Covid-19: Influenza vaccine UTD for current season. Labs evaluating immunity for Covid-19 ordered to determine if additional vaccine is absolutely necessary given concern from previous side effects and comorbid conditions; labs indicate appropriate immunity so we will hold off on Renay getting the Covid booster at this time given menstrual cycle changes with previous Covid illness.  Cancer Screenings  Standard risk for breast and colon cancer, screening not indicated until 40-45 years of age. Timing of screening will be based on discussion with parents and consideration of risks versus benefits given Renay's underlying ASD and high sensitivity to medications which may preclude sedation, etc prior to procedures.   Cervical: Patient has severe ASD, nonverbal at baseline; would need sedation and has significant hx of allergic reactions so risk > benefit at this time   CV Risk  A1c: Normal in Feb 2023; repeat in 3 years, due Feb 2026  Lipids: Ordered today  Infectious Disease  Hep C: Needs one-time screening, discuss/order with next labs  STDs: Screening not currently indicated.  and Never sexually active  PLAN:  UTD, due for Tdap but given concerns mentioned above, will  hold off at this time. Renay demonstrates appropriate immunity to Covid-19 based on labs, discussed with parents that it is appropriate to hold off on Covid-19 booster for this year given prior issues with menstrual cycle.   Orders placed today: Lipid Panel, routine labs including CBCd, CMP, additional labs including: TSH (with FT4 if indicated) Vitamin D level  Routine preventative/screening labs ordered today (as above); labs largely unremarkable and normal/appropriate, no specific follow up/management indicated and I encouraged Renay's parents to continue her regular physical activity and healthy diet.   Healthy weight: Body mass index is 22.01 kg/m². Encouraged to continue healthy lifestyle choices/modifications.  Counseled patient and family on the following topics: healthy diet and nutrition, physical activity, healthy weight, immunizations including risks and benefits, disease prevention and preventative screenings, injury prevention (seat belt use, helmet use on motorcycles/bicycles, home safety measures), high risk behaviors and specific risk reduction strategies, dental health, routine vision screening, mental health and appropriate coping mechanisms and management of stress/anxiety.      Chronic Conditions Reviewed     Autism Spectrum Disorder  chronic, stable  PLAN:  Parents are primary caretakers. Overall Renay is doing really well. Parents deny any concerns today and feel they have appropriate resources to provide appropriate care for Renay; however, they will let me know if this changes or if they need my assistance with anything.     Vitamin D Deficiency  chronic, stable  PLAN:  Continue to supplement through diet and protected outdoor exposure due to concerns about taking Vitamin D supplements given allergy history      In addition to the above, I also completed the following during today's visit: Educated patient and/or family on overall impression and recommended plan of care, patient encouraged  to voice questions and all questions were answered, reviewed return precautions and reasons to seek urgent/emergent care      Orders Placed This Encounter   Procedures    Comprehensive metabolic panel    Lipid panel    TSH    Vitamin D 25 hydroxy    SARS-CoV-2 Semi-Quant Total Ab    SARS-CoV-2 Antibody Profile, Nucleocapsid and Wisam    CBC w AUTO Differential        Follow Up   Return in about 1 year (around 10/30/2024) for Annual physical.          Natacha Lerma MD

## 2025-05-30 ENCOUNTER — OFFICE VISIT (OUTPATIENT)
Dept: FAMILY MEDICINE CLINIC | Facility: CLINIC | Age: 35
End: 2025-05-30
Payer: MEDICARE

## 2025-05-30 ENCOUNTER — LAB (OUTPATIENT)
Dept: FAMILY MEDICINE CLINIC | Facility: CLINIC | Age: 35
End: 2025-05-30
Payer: MEDICARE

## 2025-05-30 VITALS
OXYGEN SATURATION: 90 % | BODY MASS INDEX: 21.53 KG/M2 | HEART RATE: 72 BPM | WEIGHT: 126.13 LBS | HEIGHT: 64 IN | SYSTOLIC BLOOD PRESSURE: 124 MMHG | DIASTOLIC BLOOD PRESSURE: 80 MMHG | RESPIRATION RATE: 20 BRPM

## 2025-05-30 DIAGNOSIS — Z00.00 ENCOUNTER FOR ANNUAL PHYSICAL EXAM: ICD-10-CM

## 2025-05-30 DIAGNOSIS — Z00.00 ENCOUNTER FOR ANNUAL PHYSICAL EXAM: Primary | ICD-10-CM

## 2025-05-30 DIAGNOSIS — E55.9 VITAMIN D DEFICIENCY: ICD-10-CM

## 2025-05-30 DIAGNOSIS — F84.0 AUTISTIC DISORDER: ICD-10-CM

## 2025-05-30 LAB
BASOPHILS # BLD AUTO: 0.04 10*3/MM3 (ref 0–0.2)
BASOPHILS NFR BLD AUTO: 0.6 % (ref 0–1.5)
DEPRECATED RDW RBC AUTO: 39.7 FL (ref 37–54)
EOSINOPHIL # BLD AUTO: 0.08 10*3/MM3 (ref 0–0.4)
EOSINOPHIL NFR BLD AUTO: 1.2 % (ref 0.3–6.2)
ERYTHROCYTE [DISTWIDTH] IN BLOOD BY AUTOMATED COUNT: 13.2 % (ref 12.3–15.4)
HBA1C MFR BLD: 4.8 % (ref 4.8–5.6)
HCT VFR BLD AUTO: 41.7 % (ref 34–46.6)
HGB BLD-MCNC: 14.5 G/DL (ref 12–15.9)
IMM GRANULOCYTES # BLD AUTO: 0.01 10*3/MM3 (ref 0–0.05)
IMM GRANULOCYTES NFR BLD AUTO: 0.1 % (ref 0–0.5)
LYMPHOCYTES # BLD AUTO: 1.83 10*3/MM3 (ref 0.7–3.1)
LYMPHOCYTES NFR BLD AUTO: 26.8 % (ref 19.6–45.3)
MCH RBC QN AUTO: 29 PG (ref 26.6–33)
MCHC RBC AUTO-ENTMCNC: 34.8 G/DL (ref 31.5–35.7)
MCV RBC AUTO: 83.4 FL (ref 79–97)
MONOCYTES # BLD AUTO: 0.59 10*3/MM3 (ref 0.1–0.9)
MONOCYTES NFR BLD AUTO: 8.7 % (ref 5–12)
NEUTROPHILS NFR BLD AUTO: 4.27 10*3/MM3 (ref 1.7–7)
NEUTROPHILS NFR BLD AUTO: 62.6 % (ref 42.7–76)
NRBC BLD AUTO-RTO: 0 /100 WBC (ref 0–0.2)
PLATELET # BLD AUTO: 412 10*3/MM3 (ref 140–450)
PMV BLD AUTO: 11.1 FL (ref 6–12)
RBC # BLD AUTO: 5 10*6/MM3 (ref 3.77–5.28)
WBC NRBC COR # BLD AUTO: 6.82 10*3/MM3 (ref 3.4–10.8)

## 2025-05-30 PROCEDURE — 82306 VITAMIN D 25 HYDROXY: CPT | Performed by: STUDENT IN AN ORGANIZED HEALTH CARE EDUCATION/TRAINING PROGRAM

## 2025-05-30 PROCEDURE — 36415 COLL VENOUS BLD VENIPUNCTURE: CPT

## 2025-05-30 PROCEDURE — 83036 HEMOGLOBIN GLYCOSYLATED A1C: CPT | Performed by: STUDENT IN AN ORGANIZED HEALTH CARE EDUCATION/TRAINING PROGRAM

## 2025-05-30 PROCEDURE — 85025 COMPLETE CBC W/AUTO DIFF WBC: CPT | Performed by: STUDENT IN AN ORGANIZED HEALTH CARE EDUCATION/TRAINING PROGRAM

## 2025-05-30 PROCEDURE — 80053 COMPREHEN METABOLIC PANEL: CPT | Performed by: STUDENT IN AN ORGANIZED HEALTH CARE EDUCATION/TRAINING PROGRAM

## 2025-05-30 PROCEDURE — 80061 LIPID PANEL: CPT | Performed by: STUDENT IN AN ORGANIZED HEALTH CARE EDUCATION/TRAINING PROGRAM

## 2025-05-30 PROCEDURE — 84443 ASSAY THYROID STIM HORMONE: CPT | Performed by: STUDENT IN AN ORGANIZED HEALTH CARE EDUCATION/TRAINING PROGRAM

## 2025-05-30 RX ORDER — CHOLECALCIFEROL (VITAMIN D3) 25 MCG
1000 TABLET ORAL DAILY
COMMUNITY
Start: 2024-12-31

## 2025-05-30 NOTE — ASSESSMENT & PLAN NOTE
Check vitamin D level today. Continue supplementation.   Orders:    Vitamin D,25-Hydroxy; Future

## 2025-05-30 NOTE — PROGRESS NOTES
[facility] [place] [facility] - [facility]    05/30/2025       Patient Name: Renay Jenkins   YOB: 1990   Medical Record Number: [id number]   Primary Care Physician: Natacha Lerma MD       Subjective     Chief Complaint     Chief Complaint   Patient presents with    Annual Exam     Accompanied by mother who provides history.   History of Present Illness     Menstrual Cycle  - An increase in her exercise regimen appears to have a negative impact on her menstrual cycle.  - She received the Novavax COVID-19 vaccine in the fall, which temporarily disrupted her menstrual cycle for a period of 2 months.  - Currently, her menstrual cycle is regular.    Calluses  - She has developed calluses on her hands from using the treadmill, which are managed with lotion application.    She maintains a consistent daily routine, which includes spending time outdoors on their deck. Her dietary habits are generally healthy, with a recent meal consisting of oatmeal with almond milk for breakfast and a combination of vegetables and fruits for lunch, supplemented by a few cookies. She engages in regular physical activity, walking approximately 1.75 miles daily. She has been supplementing with vitamin D for the past 5 months, taking a daily dose of 1000 IU. She recently had a dental examination, which revealed no issues with her teeth. Her urinary function is normal, and she consumes between 70 to 80 ounces of water daily. She avoids carbonated beverages.    Preventative/HCM     DIET: healthy eating habits and balanced diet  EXERCISE: walks (at least 3-4 days weekly)  MOOD: Denies depression symptoms (PHQ-2 screen is negative)  VISION/DENTAL: UTD, no concerns; had recent check up and everything was good  SOCIAL HISTORY:  reports that she has never smoked. She has never used smokeless tobacco. She reports that she does not drink alcohol and does not use drugs.  SEXUAL HISTORY:  reports never being sexually  "active.    MENSTRUAL STATUS:   Menstrual Cycles: right now doing okay; hx of irregular menstrual cycles after Covid immunization.     IMMUNIZATIONS: Up to date    Current Outpatient Medications   Medication Instructions    cholecalciferol (VITAMIN D3) 1,000 Units, Daily    Polyethylene Glycol 3350 (MIRALAX PO) Take  by mouth 2 (Two) Times a Day.        Review of Systems   A medically appropriate and patient-specific review of systems was performed. Pertinent findings are mentioned in the HPI, with no additional significant findings beyond those already noted.      Patient History   The following portions of the patient's history were reviewed and updated as appropriate:   allergies, current medications, problem list, PMHx, PSHx, PFHx, past social history    Objective     Vitals:    05/30/25 1414   BP: 124/80   Pulse: 72   Resp: 20   SpO2: 90%   Weight: 57.2 kg (126 lb 2 oz)   Height: 162.6 cm (64\")        Physical Exam   Constitutional: Alert, well-appearing, no acute distress  HENT: NCAT, mucous membranes moist, bilateral TM normal, oropharynx normal  Neck: Supple, no thyromegaly, no lymphadenopathy  Respiratory: No respiratory distress, good effort and air entry, clear to auscultation bilaterally   Cardiovascular: RRR, no LE edema  Neuro: No gross focal deficits, normal gait  Psychiatric: At baseline. Cooperative, appropriate mood and affect  Skin: No apparent rashes or lesions    Assessment & Plan        Encounter for annual physical exam  Age appropriate cancer and preventative screenings were reviewed and ordered as indicated. Immunizations were reviewed and education/instructions were provided on any recommended vaccines that are currenlty due. Counseled patient on diet, exercise, weight, vaccines, disease/screening prevention, safety, risk reduction, dental/vision care, and mental health. Addressed all patient/family questions.  Orders:    CBC Auto Differential; Future    Comprehensive Metabolic Panel; " Future    Lipid Panel; Future    Hemoglobin A1c; Future    TSH Rfx On Abnormal To Free T4; Future    Vitamin D deficiency  Check vitamin D level today. Continue supplementation.   Orders:    Vitamin D,25-Hydroxy; Future    Autistic disorder  Chronic, stable  Mother is primary caregiver. No new concerns. Has appropriate resources.             Follow Up   Return in about 1 year (around 5/30/2026) for Annual physical.      This medical documentation was produced in part using speech recognition software (Dragon Dictation System) and may contain errors related to that system including errors in grammar, punctuation, and spelling, as well as words and phrases that may be inappropriate and not intentional --- If there are any questions or concerns please feel free to contact me, the dictating provider, for clarification.     Patient or patient representative verbalized consent for the use of Ambient Listening during the visit with  Natacha Lerma MD for chart documentation.     Disclaimer For Patients: Per the Federal 21st Century CURES Act and as of April 2021, medical records (including provider notes and laboratory/imaging results) are to be made available to patient’s and/or their designees as soon as the documents are signed/resulted. While the intention is to ensure transparency and to engage patients in their healthcare, this immediate access may create unintended consequences. This document uses language intended for communication between medical experts and diagnostic results are often interpreted with the entirety of the patient’s clinical picture in mind. It is recommended that patients and/or their designees review all available information with their primary or specialist providers for explanation and guidance to avoid misinterpretation based on layperson understanding, non-medical expert opinions, or internet searches.    Natacha Lerma MD

## 2025-05-31 LAB
25(OH)D3 SERPL-MCNC: 29.5 NG/ML (ref 30–100)
ALBUMIN SERPL-MCNC: 4.3 G/DL (ref 3.5–5.2)
ALBUMIN/GLOB SERPL: 1.3 G/DL
ALP SERPL-CCNC: 102 U/L (ref 39–117)
ALT SERPL W P-5'-P-CCNC: 15 U/L (ref 1–33)
ANION GAP SERPL CALCULATED.3IONS-SCNC: 11.5 MMOL/L (ref 5–15)
AST SERPL-CCNC: 15 U/L (ref 1–32)
BILIRUB SERPL-MCNC: 0.2 MG/DL (ref 0–1.2)
BUN SERPL-MCNC: 7 MG/DL (ref 6–20)
BUN/CREAT SERPL: 11.3 (ref 7–25)
CALCIUM SPEC-SCNC: 9.1 MG/DL (ref 8.6–10.5)
CHLORIDE SERPL-SCNC: 105 MMOL/L (ref 98–107)
CHOLEST SERPL-MCNC: 174 MG/DL (ref 0–200)
CO2 SERPL-SCNC: 20.5 MMOL/L (ref 22–29)
CREAT SERPL-MCNC: 0.62 MG/DL (ref 0.57–1)
EGFRCR SERPLBLD CKD-EPI 2021: 118.5 ML/MIN/1.73
GLOBULIN UR ELPH-MCNC: 3.2 GM/DL
GLUCOSE SERPL-MCNC: 84 MG/DL (ref 65–99)
HDLC SERPL-MCNC: 39 MG/DL (ref 40–60)
LDLC SERPL CALC-MCNC: 106 MG/DL (ref 0–100)
LDLC/HDLC SERPL: 2.61 {RATIO}
POTASSIUM SERPL-SCNC: 4.1 MMOL/L (ref 3.5–5.2)
PROT SERPL-MCNC: 7.5 G/DL (ref 6–8.5)
SODIUM SERPL-SCNC: 137 MMOL/L (ref 136–145)
TRIGL SERPL-MCNC: 167 MG/DL (ref 0–150)
TSH SERPL DL<=0.05 MIU/L-ACNC: 0.71 UIU/ML (ref 0.27–4.2)
VLDLC SERPL-MCNC: 29 MG/DL (ref 5–40)